# Patient Record
Sex: FEMALE | Race: ASIAN | HISPANIC OR LATINO
[De-identification: names, ages, dates, MRNs, and addresses within clinical notes are randomized per-mention and may not be internally consistent; named-entity substitution may affect disease eponyms.]

---

## 2023-12-09 ENCOUNTER — APPOINTMENT (OUTPATIENT)
Dept: ORTHOPEDIC SURGERY | Facility: CLINIC | Age: 58
End: 2023-12-09
Payer: MEDICAID

## 2023-12-09 PROBLEM — Z00.00 ENCOUNTER FOR PREVENTIVE HEALTH EXAMINATION: Status: ACTIVE | Noted: 2023-12-09

## 2023-12-09 PROCEDURE — 72040 X-RAY EXAM NECK SPINE 2-3 VW: CPT

## 2023-12-09 PROCEDURE — 99204 OFFICE O/P NEW MOD 45 MIN: CPT

## 2023-12-09 PROCEDURE — 73010 X-RAY EXAM OF SHOULDER BLADE: CPT | Mod: RT

## 2023-12-09 PROCEDURE — 73030 X-RAY EXAM OF SHOULDER: CPT | Mod: RT

## 2023-12-09 RX ORDER — METHYLPREDNISOLONE 4 MG/1
4 TABLET ORAL
Qty: 1 | Refills: 0 | Status: ACTIVE | COMMUNITY
Start: 2023-12-09 | End: 1900-01-01

## 2024-01-10 ENCOUNTER — APPOINTMENT (OUTPATIENT)
Dept: ORTHOPEDIC SURGERY | Facility: CLINIC | Age: 59
End: 2024-01-10
Payer: MEDICAID

## 2024-01-10 VITALS — WEIGHT: 210 LBS | BODY MASS INDEX: 32.96 KG/M2 | HEIGHT: 67 IN

## 2024-01-10 DIAGNOSIS — G56.31 LESION OF RADIAL NERVE, RIGHT UPPER LIMB: ICD-10-CM

## 2024-01-10 DIAGNOSIS — S99.919A UNSPECIFIED INJURY OF UNSPECIFIED ANKLE, INITIAL ENCOUNTER: ICD-10-CM

## 2024-01-10 PROCEDURE — 99214 OFFICE O/P EST MOD 30 MIN: CPT

## 2024-01-10 RX ORDER — METHYLPREDNISOLONE 4 MG/1
4 TABLET ORAL
Qty: 1 | Refills: 0 | Status: ACTIVE | COMMUNITY
Start: 2024-01-10 | End: 1900-01-01

## 2024-01-10 NOTE — IMAGING
[No bony abnormalities] : No bony abnormalities [Right] : right shoulder [AC Joint Arthrosis] : AC Joint Arthrosis [There are no fractures, subluxations or dislocations. No significant abnormalities are seen] : There are no fractures, subluxations or dislocations. No significant abnormalities are seen [de-identified] :   ----------------------------------------------------------------------------   Right shoulder exam:   Skin: no significant pertinent finding Inspection: no obvious deformity, no obvious masses, no swelling, no effusion, no atrophy parathesis right arm ROM:    FF: 160 a  v 180 p    ER: 70    IR: T12 Tenderness:    (neg) Anterior/Biceps:    (neg) Posterior    (neg) Lateral    (neg) Trapezius    (neg) Scapula    (neg) AC joint    (neg) Crepitus with ROM Stability:    (neg) Translation    (neg) Apprehension    (neg) Clicking Additional tests:    (neg) Neer's    (neg)Hawkin's    (neg) Fuller's    (neg) Speed    (neg) Cross chest adduction      (+) right tricep weakness    Strength:    FF: 5/5    ER: 5/5    IR: 5/5    Biceps: 5/5    Triceps: 2/5    Distal: 5/5 Neuro: In tact to light touch throughout Vascularity: Extremity warm and well perfused     ----------------------------------------------------------------------------      Right wrist/hand exam:   Inspection:    (neg) Deformity    (neg) Swelling/Effusion ROM:    WF: 80  WE: 80   Digit ROM: full Tenderness:    (neg) Dorsal wrist      (neg) Volar wrist    (neg) Radial wrist       (neg) Ulnar wrist    (neg) Snuff box    (neg) DRUJ    (neg) Thumb CMC    (neg) A1 pulley                                                                (neg) Metacarpal        (neg) Phalanges                               (neg) MP joint             (neg) PIP joint           (neg) DIP joint  Additional tests:    (neg) Carpal compression    (neg) Tinel's    (neg) Finkelstein's    (neg) Galvez's    (neg) CMC grind    (neg) TFCC grind    (neg) Tight lateral retinaculum     (+)weakness with finger abduction Strength: 5/5 DF/VF//Intrinsic 4+/5 wrist extension     extension weakness, triceps weakness Neuro: In tact to light touch throughout all distributions Vascularity: Extremity warm and well perfused    ----------------------------------------------------------------------------   Cervical spine exam:   Inspection:        (neg) Abnormal alignment (kyphosis/lordosis)   (neg) Atrophy ROM:    Pain:               (neg) Flexion/extension    (neg) Rotation    Stiffness:        (neg) Flexion/extension    (neg) Rotation Tenderness:    Trapezial:       (neg) Right    (neg) Left    (neg) Midline    Paraspinal:     (neg) Right    (neg) Left    Rhomboid :     (neg) Right    (neg) Left    Scapula:         (neg) Right   (neg) Left Strength:    Deltoid:          Right: 5/5   .  Left: 5/5    Biceps:          Right: 5/5   .  Left: 5/5    Triceps:         Right: 5/5   .  Left: 5/5    Wrist flex      Right: 5/5   .   Left: 5/5    Wrist ext:      Right: 5/5   .   Left: 5/5    Hand:            Right: 5/5   .   Left: 5/5 Neuro: DTR's wnl.  Sensation to light touch grossly in tact in all distributions.    (neg) Kelly's    (neg) Spurling    (neg) Lhermitte Vascularity: Extremity warm and well perfused Gait: normal   [FreeTextEntry1] : greater tuberosity modeling

## 2024-01-10 NOTE — HISTORY OF PRESENT ILLNESS
[3] : 3 [Tingling] : tingling [Frequent] : frequent [de-identified] : 2 weeks of right  shoulder pain radiating down to the hand. difficulty lifting and holding things. Pain is mostly in the upper arm area. 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 No previous shoulder problems  [] : no [FreeTextEntry3] : 11.13.2023 [FreeTextEntry5] : Patient fell and states she started feeling pain in the shoulder a few days later. [FreeTextEntry6] : numbness [de-identified] : physical therapy

## 2024-01-10 NOTE — DISCUSSION/SUMMARY
[Medication Risks Reviewed] : Medication risks reviewed [de-identified] : Continue Physical Therapy MDP EMG f/u 6-8 weeks ----------------------------------------------------------------------------  All relevant imaging studies pertinent to today's visit, including x-rays, MRI's and/or other advanced imaging studies (CT/etc) were independently interpreted and reviewed with the patient as needed. Implications of the studies together with the patient's clinical picture were discussed to formulate a working diagnosis and management options were detailed.  The patient was advised of the diagnosis.  The natural history of the pathology was explained in full. All questions were answered.  The risks and benefits of conservative and interventional treatment alternatives were explained to the patient

## 2024-07-15 NOTE — REASON FOR VISIT
15-Jul-2024 08:15 [FreeTextEntry2] : follow up right shoulder.  Patient almost passed out during EMG and doctor didn't want to continue the test.  , feeling somewhat better, did not take MDP